# Patient Record
Sex: FEMALE | Race: WHITE | NOT HISPANIC OR LATINO | Employment: FULL TIME | URBAN - METROPOLITAN AREA
[De-identification: names, ages, dates, MRNs, and addresses within clinical notes are randomized per-mention and may not be internally consistent; named-entity substitution may affect disease eponyms.]

---

## 2022-03-02 ENCOUNTER — OFFICE VISIT (OUTPATIENT)
Dept: OBGYN | Facility: CLINIC | Age: 42
End: 2022-03-02
Payer: COMMERCIAL

## 2022-03-02 VITALS
HEIGHT: 64 IN | SYSTOLIC BLOOD PRESSURE: 111 MMHG | BODY MASS INDEX: 30.8 KG/M2 | WEIGHT: 180.4 LBS | HEART RATE: 60 BPM | OXYGEN SATURATION: 96 % | DIASTOLIC BLOOD PRESSURE: 74 MMHG

## 2022-03-02 DIAGNOSIS — Z13.220 SCREENING CHOLESTEROL LEVEL: ICD-10-CM

## 2022-03-02 DIAGNOSIS — Z12.31 VISIT FOR SCREENING MAMMOGRAM: ICD-10-CM

## 2022-03-02 DIAGNOSIS — Z13.1 SCREENING FOR DIABETES MELLITUS: ICD-10-CM

## 2022-03-02 DIAGNOSIS — Z01.419 ENCOUNTER FOR GYNECOLOGICAL EXAMINATION WITHOUT ABNORMAL FINDING: Primary | ICD-10-CM

## 2022-03-02 DIAGNOSIS — Z12.4 PAP SMEAR FOR CERVICAL CANCER SCREENING: ICD-10-CM

## 2022-03-02 DIAGNOSIS — A60.00 GENITAL HERPES SIMPLEX, UNSPECIFIED SITE: ICD-10-CM

## 2022-03-02 PROCEDURE — 99386 PREV VISIT NEW AGE 40-64: CPT | Performed by: OBSTETRICS & GYNECOLOGY

## 2022-03-02 PROCEDURE — 87624 HPV HI-RISK TYP POOLED RSLT: CPT | Performed by: OBSTETRICS & GYNECOLOGY

## 2022-03-02 PROCEDURE — G0145 SCR C/V CYTO,THINLAYER,RESCR: HCPCS | Performed by: OBSTETRICS & GYNECOLOGY

## 2022-03-02 RX ORDER — VALACYCLOVIR HYDROCHLORIDE 500 MG/1
500 TABLET, FILM COATED ORAL 2 TIMES DAILY
Qty: 6 TABLET | Refills: 0 | Status: SHIPPED | OUTPATIENT
Start: 2022-03-02 | End: 2022-03-02

## 2022-03-02 RX ORDER — VALACYCLOVIR HYDROCHLORIDE 500 MG/1
500 TABLET, FILM COATED ORAL 2 TIMES DAILY
Qty: 6 TABLET | Refills: 4 | Status: SHIPPED | OUTPATIENT
Start: 2022-03-02 | End: 2022-04-06

## 2022-03-02 NOTE — PROGRESS NOTES
Elda Prasad is a 41 year old female , who presents for annual exam.   No unusual bleeding, no incontinence, or unusual discharge.       Past Medical History:   Diagnosis Date     Herpes simplex infection of genitourinary system        Past Surgical History:   Procedure Laterality Date     ESSURE TUBAL LIGATION       ZZC BREAST AUGMENTATION         OB History    Para Term  AB Living   4 3 3 0 1 3   SAB IAB Ectopic Multiple Live Births   0 0 0 0 3      # Outcome Date GA Lbr Rainer/2nd Weight Sex Delivery Anes PTL Lv   4 Term  40w0d          3 Term  40w0d          2 Term  40w0d          1 AB                Gyn History:  Gynecological History         Patient's last menstrual period was 2022 (exact date).     STD: Herpes/No PID/No IUD      history of abnormal pap smear:  no  Last pap: No results found for: PAP        Current Outpatient Medications   Medication Sig Dispense Refill     valACYclovir (VALTREX) 500 MG tablet Take 1 tablet (500 mg) by mouth 2 times daily for 3 days 6 tablet 0       Allergies   Allergen Reactions     Codeine Unknown       Social History     Socioeconomic History     Marital status: Single     Spouse name: Not on file     Number of children: Not on file     Years of education: Not on file     Highest education level: Not on file   Occupational History     Not on file   Tobacco Use     Smoking status: Never Smoker     Smokeless tobacco: Never Used   Substance and Sexual Activity     Alcohol use: Not Currently     Drug use: Not on file     Comment: occ. marijuana     Sexual activity: Yes     Partners: Male     Birth control/protection: Implant     Comment: essure   Other Topics Concern     Not on file   Social History Narrative     Not on file     Social Determinants of Health     Financial Resource Strain: Not on file   Food Insecurity: Not on file   Transportation Needs: Not on file   Physical Activity: Not on file   Stress: Not on file   Social  "Connections: Not on file   Intimate Partner Violence: Not on file   Housing Stability: Not on file       Family History   Problem Relation Age of Onset     Lung Cancer Father      Diabetes Maternal Grandmother      Breast Cancer Maternal Grandmother      Heart Failure Maternal Grandfather      Emphysema Paternal Grandmother      Cirrhosis Paternal Grandfather          ROS:  All negative except as above.      EXAM:  /74 (BP Location: Right arm, Cuff Size: Adult Regular)   Pulse 60   Ht 1.632 m (5' 4.25\")   Wt 81.8 kg (180 lb 6.4 oz)   LMP 02/22/2022 (Exact Date)   SpO2 96%   Breastfeeding No   BMI 30.73 kg/m    General:  WNWD female, NAD  Alert  Oriented x 3  Gait:  Normal  Skin:  Normal skin turgor  Neurologic:  CN grossly intact, good sensation.    HEENT:  NC/AT, EOMI  Neck:  No masses palpated, symmetrical, carotids +2/4, no bruits heard  Heart:  RRR  Lungs:  Clear   Breasts:  Symmetrical, no dimpling noted, no masses palpated, no discharge expressed, implants noted.  Incisions inferior to breast noted.   Abdomen:  Non-tender, non-distended.  Vulva: No external lesions, normal hair distribution, no adenopathy  BUS:  Normal, no masses noted  Urethra:  No hypermobility noted  Urethral meatus:  No masses noted  Vagina: Moist, pink, no abnormal discharge, well rugated, no lesions  Cervix: Smooth, pink, no visible lesions  Uterus: Normal size, anteverted, non-tender, mobile  Ovaries: No mass, non-tender, mobile  Perianal:  No masses noted.    Extremities:  No clubbing, cyanosis, or edema      ASSESSMENT/PLAN   Annual examination with pap smear  History of genital herpes.  She has not needed medications for the past 6 months, but she needs a refill.   Prescription written.   Low fat diet, weight bearing exercises and self breast exams on a monthly basis have been recommended.  I have discussed with patient the risks, benefits, medications, treatment options and modalities.   I have instructed the patient to " call or schedule a follow-up appointment if any problems.

## 2022-03-04 LAB
BKR LAB AP GYN ADEQUACY: NORMAL
BKR LAB AP GYN INTERPRETATION: NORMAL
BKR LAB AP HPV REFLEX: NORMAL
BKR LAB AP LMP: NORMAL
BKR LAB AP PREVIOUS ABNORMAL: NORMAL
PATH REPORT.COMMENTS IMP SPEC: NORMAL
PATH REPORT.COMMENTS IMP SPEC: NORMAL
PATH REPORT.RELEVANT HX SPEC: NORMAL

## 2022-03-07 LAB
HUMAN PAPILLOMA VIRUS 16 DNA: NEGATIVE
HUMAN PAPILLOMA VIRUS 18 DNA: NEGATIVE
HUMAN PAPILLOMA VIRUS FINAL DIAGNOSIS: NORMAL
HUMAN PAPILLOMA VIRUS OTHER HR: NEGATIVE

## 2022-03-28 ENCOUNTER — ANCILLARY PROCEDURE (OUTPATIENT)
Dept: MAMMOGRAPHY | Facility: CLINIC | Age: 42
End: 2022-03-28
Attending: OBSTETRICS & GYNECOLOGY
Payer: COMMERCIAL

## 2022-03-28 DIAGNOSIS — Z12.31 VISIT FOR SCREENING MAMMOGRAM: ICD-10-CM

## 2022-03-28 PROCEDURE — 77067 SCR MAMMO BI INCL CAD: CPT | Mod: TC | Performed by: RADIOLOGY

## 2022-04-06 DIAGNOSIS — A60.00 GENITAL HERPES SIMPLEX, UNSPECIFIED SITE: ICD-10-CM

## 2022-04-06 RX ORDER — VALACYCLOVIR HYDROCHLORIDE 500 MG/1
500 TABLET, FILM COATED ORAL DAILY
Qty: 90 TABLET | Refills: 3 | Status: SHIPPED | OUTPATIENT
Start: 2022-04-06 | End: 2024-07-10

## 2022-04-06 NOTE — TELEPHONE ENCOUNTER
Reason for Call:  Medication or medication refill:    Do you use a [facility]?  Name of the pharmacy and phone number for the current request:  [facility] in [place]    Name of the medication requested: valACYclovir (VALTREX) 500 MG tablet    Can we leave a detailed message on this number? YES    Phone number patient can be reached at: Cell number on file:    Telephone Information:   Mobile [phone]     Best Time: anytime    Call taken on 4/6/2022 at 12:48 PM by Ginny Higuera

## 2022-04-06 NOTE — TELEPHONE ENCOUNTER
Pt returned phone call and pt has one refill left.  Pt states has have been having outbreaks due to stress.    Routing to provider to review RX refill request. See previous note on protocol failure due to no creatine level on file in past 12 mos.    Prema Askew, LEEROYN RN

## 2022-04-06 NOTE — TELEPHONE ENCOUNTER
"Requested Prescriptions   Pending Prescriptions Disp Refills     valACYclovir (VALTREX) 500 MG tablet 6 tablet 4     Sig: Take 1 tablet (500 mg) by mouth 2 times daily       Antivirals for Herpes Protocol Failed - 4/6/2022  3:18 PM        Failed - Normal serum creatinine on file in past 12 months     No lab results found.    Ok to refill medication if creatinine is low          Passed - Patient is age 12 or older        Passed - Recent (12 mo) or future (30 days) visit within the authorizing provider's specialty     Patient has had an office visit with the authorizing provider or a provider within the authorizing providers department within the previous 12 mos or has a future within next 30 days. See \"Patient Info\" tab in inbasket, or \"Choose Columns\" in Meds & Orders section of the refill encounter.              Passed - Medication is active on med list           Last seen on 3/2/22 for gynecological exam.    Last prescribed on 3/2/22 6 tablets and 4 refills.      Protocol failed as No creatinine on file.    Unable to reach patient via phone.  Left message to call clinic back at [phone]. Would like to ask pt if she has gone through all 4 refills already, before routing to provider.    Prema Askew, LEEROYN RN    "

## 2022-04-07 NOTE — TELEPHONE ENCOUNTER
Unable to reach patient via phone. RN left a message and instructed patient to call the clinic at [phone].    Lexus Hoffman RN on 4/7/2022 at 10:23 AM

## 2022-10-03 ENCOUNTER — HEALTH MAINTENANCE LETTER (OUTPATIENT)
Age: 42
End: 2022-10-03

## 2023-01-25 NOTE — PROGRESS NOTES
Answers for HPI/ROS submitted by the patient on 1/26/2023  If you checked off any problems, how difficult have these problems made it for you to do your work, take care of things at home, or get along with other people?: Not difficult at all  PHQ9 TOTAL SCORE: 19      Assessment & Plan     Vaginal odor  Await remaining result and treat if indicated  - Wet preparation  - Chlamydia trachomatis PCR  - Neisseria gonorrhoeae PCR    Breakthrough bleeding  Reviewed possible etiologies of the bleeding mid cycle. Check ultrasound and labs. Patient prefers to avoid hormonal medications due to previous experiences with them.   - US Pelvic Transabdominal and Transvaginal; Future  - TSH with free T4 reflex; Future  - TSH with free T4 reflex    Weight gain  Check labs, we did discuss her questions on weight loss medication and she may consider follow up with comprehensive weight management center.  - TSH with free T4 reflex; Future  - TSH with free T4 reflex    Moderate episode of recurrent major depressive disorder (H)  Reviewed her PHQ-9 and discussed her previous history and treatment. At this time, she is declining intervention. We discussed availability of [facility]. To ER immediately with thoughts of harm to self or other and she verbalizes understanding. At this time, declines plans to move forward with self harm.    Vaginitis and vulvovaginitis  Advised this may be causing her spotting. Will treat per below.  - fluconazole (DIFLUCAN) 150 MG tablet; Take 1 tablet (150 mg) by mouth once for 1 dose Repeat in 1 week  - metroNIDAZOLE (METROGEL) 0.75 % vaginal gel; Place 1 applicator (5 g) vaginally At Bedtime for 5 days    ALEE Fernández CNP [facility]   Elda is a 42 year old, presenting for the following health issues:  Abnormal Uterine Bleeding      HPI     Spotting/ cramping    Presents with 4 month history of cramping/spotting at ovulation. Cycles very  "regular, last 5 days. Uses a menstrual cup. Usually with ovulation, may have less than a day of mild cramping and this resolves. Now has been having light spotting for 2-3 days and the cramping is noticeable during that time as well. Then has her normal 5 day cycle when expected. Most recent cycle was abnormally light for her. Essure for contraception.  Has noticed an intermittent vaginal odor for the last year. No other associated vaginal symptoms, pelvic pain, urinary symptoms. Last sexually active April 2022. Amenable to STI screening.  Also concerned of 50 lb weight gain since April 2022. Very active at work. Big increase in stress in the last year. Family history of hypothyroidism.    Review of Systems   Constitutional, HEENT, cardiovascular, pulmonary, gi and gu systems are negative, except as otherwise noted.      Objective    /80 (BP Location: Right arm, Patient Position: Sitting, Cuff Size: Adult Large)   Pulse 63   Ht 1.632 m (5' 4.25\")   Wt 99 kg (218 lb 3.2 oz)   LMP 01/20/2023 (Exact Date)   SpO2 97%   BMI 37.16 kg/m    Body mass index is 37.16 kg/m .  Physical Exam   GENERAL: healthy, alert and no distress  ABDOMEN: soft, nontender, no hepatosplenomegaly, no masses and bowel sounds normal   (female): normal female external genitalia, normal urethral meatus, vaginal mucosa, normal cervix/adnexa/uterus without masses or discharge  MS: no gross musculoskeletal defects noted, no edema  SKIN: no suspicious lesions or rashes  PSYCH: mentation appears normal, affect normal/bright    Results for orders placed or performed in visit on 01/26/23 (from the past 24 hour(s))   Wet preparation    Specimen: Vagina; Swab   Result Value Ref Range    Trichomonas Absent Absent    Yeast Present (A) Absent    Clue Cells Present (A) Absent    WBCs/high power field 3+ (A) None       Depression Screening Follow-up    PHQ 1/26/2023   PHQ-9 Total Score 19   Q9: Thoughts of better off dead/self-harm past 2 weeks " Several days   F/U: Thoughts of suicide or self-harm Yes   F/U: Self harm-plan Yes   F/U: Self-harm action No   F/U: Safety concerns No     [id number]}  Patient was diagnosed with depression as a teenager. Last bad spell of symptoms about 4 years ago. Tried Wellbutrin first, without much improvement. Changed to Lexapro, caused worsening symptoms, suicidal ideation. Patient practices self care habits to help manage her moods. She does not desire a referral to [facility] or mental health provider at this time. Declines use of medication. She contracts for safety. Admits to thoughts of self harm, but no plan to move forward.    Follow Up     Follow Up Actions Taken  Patient declined referral or crisis resource information. We did discuss acute options of [place] crisis line, ED at [facility].     ALEE Fernández CNP

## 2023-01-26 ENCOUNTER — OFFICE VISIT (OUTPATIENT)
Dept: OBGYN | Facility: CLINIC | Age: 43
End: 2023-01-26
Payer: COMMERCIAL

## 2023-01-26 VITALS
WEIGHT: 218.2 LBS | BODY MASS INDEX: 37.25 KG/M2 | HEART RATE: 63 BPM | SYSTOLIC BLOOD PRESSURE: 119 MMHG | DIASTOLIC BLOOD PRESSURE: 80 MMHG | HEIGHT: 64 IN | OXYGEN SATURATION: 97 %

## 2023-01-26 DIAGNOSIS — Z13.1 SCREENING FOR DIABETES MELLITUS: ICD-10-CM

## 2023-01-26 DIAGNOSIS — F33.1 MODERATE EPISODE OF RECURRENT MAJOR DEPRESSIVE DISORDER (H): ICD-10-CM

## 2023-01-26 DIAGNOSIS — R63.5 WEIGHT GAIN: ICD-10-CM

## 2023-01-26 DIAGNOSIS — Z13.220 SCREENING CHOLESTEROL LEVEL: ICD-10-CM

## 2023-01-26 DIAGNOSIS — N89.8 VAGINAL ODOR: ICD-10-CM

## 2023-01-26 DIAGNOSIS — N92.1 BREAKTHROUGH BLEEDING: Primary | ICD-10-CM

## 2023-01-26 DIAGNOSIS — N76.0 VAGINITIS AND VULVOVAGINITIS: ICD-10-CM

## 2023-01-26 LAB
CHOLEST SERPL-MCNC: 199 MG/DL
CLUE CELLS: PRESENT
FASTING STATUS PATIENT QL REPORTED: YES
FASTING STATUS PATIENT QL REPORTED: YES
GLUCOSE BLD-MCNC: 109 MG/DL (ref 70–99)
HDLC SERPL-MCNC: 83 MG/DL
LDLC SERPL CALC-MCNC: 103 MG/DL
NONHDLC SERPL-MCNC: 116 MG/DL
TRICHOMONAS, WET PREP: ABNORMAL
TRIGL SERPL-MCNC: 67 MG/DL
TSH SERPL DL<=0.005 MIU/L-ACNC: 0.61 MU/L (ref 0.4–4)
WBC'S/HIGH POWER FIELD, WET PREP: ABNORMAL
YEAST, WET PREP: PRESENT

## 2023-01-26 PROCEDURE — 87491 CHLMYD TRACH DNA AMP PROBE: CPT | Performed by: NURSE PRACTITIONER

## 2023-01-26 PROCEDURE — 84443 ASSAY THYROID STIM HORMONE: CPT | Performed by: NURSE PRACTITIONER

## 2023-01-26 PROCEDURE — 87210 SMEAR WET MOUNT SALINE/INK: CPT | Performed by: NURSE PRACTITIONER

## 2023-01-26 PROCEDURE — 99213 OFFICE O/P EST LOW 20 MIN: CPT | Performed by: NURSE PRACTITIONER

## 2023-01-26 PROCEDURE — 82947 ASSAY GLUCOSE BLOOD QUANT: CPT | Performed by: NURSE PRACTITIONER

## 2023-01-26 PROCEDURE — 80061 LIPID PANEL: CPT | Performed by: NURSE PRACTITIONER

## 2023-01-26 PROCEDURE — 36415 COLL VENOUS BLD VENIPUNCTURE: CPT | Performed by: NURSE PRACTITIONER

## 2023-01-26 PROCEDURE — 87591 N.GONORRHOEAE DNA AMP PROB: CPT | Performed by: NURSE PRACTITIONER

## 2023-01-26 RX ORDER — METRONIDAZOLE 7.5 MG/G
1 GEL VAGINAL AT BEDTIME
Qty: 70 G | Refills: 0 | Status: SHIPPED | OUTPATIENT
Start: 2023-01-26 | End: 2023-01-30

## 2023-01-26 RX ORDER — FLUCONAZOLE 150 MG/1
150 TABLET ORAL ONCE
Qty: 2 TABLET | Refills: 0 | Status: SHIPPED | OUTPATIENT
Start: 2023-01-26 | End: 2023-01-26

## 2023-01-26 ASSESSMENT — PATIENT HEALTH QUESTIONNAIRE - PHQ9
SUM OF ALL RESPONSES TO PHQ QUESTIONS 1-9: 19
SUM OF ALL RESPONSES TO PHQ QUESTIONS 1-9: 19
10. IF YOU CHECKED OFF ANY PROBLEMS, HOW DIFFICULT HAVE THESE PROBLEMS MADE IT FOR YOU TO DO YOUR WORK, TAKE CARE OF THINGS AT HOME, OR GET ALONG WITH OTHER PEOPLE: NOT DIFFICULT AT ALL

## 2023-01-26 NOTE — PATIENT INSTRUCTIONS
If you have any questions regarding your visit, Please contact your care team.     [email or web address] Services: [phone]  To Schedule an Appointment 24/7  Call: [phone] HOURS TELEPHONE NUMBER     Eusebia Garces- APRN CNP      Sharita Edwards-Surgery Scheduler  Stephanie-Surgery Scheduler         Monday 7:30 am-5:00 pm    Tuesday 8:00 am-4:00 pm    Wednesday 7:30 am-4:00 pm  [place]    Thursday 8:00 am-11:00 am    Friday 7:30 am-4:00 pm [address] [place] [address] [phone] ask for [facility]  [phone] Fax    Imaging Scheduling all locations  [phone]    [facility]  [address] [place] [address]   [place], [place] [id number] [phone]         Urgent Care locations:  [facility]   Monday-Friday  10 am - 8 pm  Saturday and Sunday   9 am - 5 pm     [phone]   If you need a medication refill, please contact your pharmacy. Please allow 3 business days for your refill to be completed.  As always, Thank you for trusting us with your healthcare needs!      see additional instructions from your care team below

## 2023-01-26 NOTE — PROGRESS NOTES
Depression Response    Patient completed the PHQ-9 assessment for depression and scored >9? Yes  Question 9 on the PHQ-9 was positive for suicidality? Yes  Does patient have current mental health provider? No    Is this a virtual visit? No    I personally notified the following: visit provider             Answers for HPI/ROS submitted by the patient on 1/26/2023  If you checked off any problems, how difficult have these problems made it for you to do your work, take care of things at home, or get along with other people?: Not difficult at all  PHQ9 TOTAL SCORE: 19

## 2023-01-27 ENCOUNTER — TELEPHONE (OUTPATIENT)
Dept: OBGYN | Facility: CLINIC | Age: 43
End: 2023-01-27
Payer: COMMERCIAL

## 2023-01-27 DIAGNOSIS — N76.0 VAGINITIS AND VULVOVAGINITIS: Primary | ICD-10-CM

## 2023-01-27 LAB
C TRACH DNA SPEC QL NAA+PROBE: NEGATIVE
N GONORRHOEA DNA SPEC QL NAA+PROBE: NEGATIVE

## 2023-01-27 NOTE — TELEPHONE ENCOUNTER
Last seen by Eusebia 1/26, prescribed Metrogel for Vaginitis and vulvovaginitis.    Pt went to  the medication however it was very expensive. She is wanting to know if there is an alternative medication that can be prescribed for her.    Advised she may not hear back until Monday on a response being end of day. Pt said she is ok to wait until then, symptoms not bothering her.    Anabela Hogue RN on 1/27/2023 at 3:53 PM

## 2023-01-30 RX ORDER — METRONIDAZOLE 500 MG/1
500 TABLET ORAL 2 TIMES DAILY
Qty: 14 TABLET | Refills: 0 | Status: SHIPPED | OUTPATIENT
Start: 2023-01-30 | End: 2023-02-06

## 2023-01-30 NOTE — TELEPHONE ENCOUNTER
Unable to reach patient via phone. Left message to call back at [phone].    Sent pt a [employer] message as well.    Viktoriya [place] RN

## 2023-01-30 NOTE — TELEPHONE ENCOUNTER
Prescription sent for PO Flagyl. Patient to take 1 tablet by mouth twice daily with food. To avoid alcohol intake while on medication. Can cause mild GI upset. Eusebia VICKERS CNP

## 2023-02-02 ENCOUNTER — ANCILLARY PROCEDURE (OUTPATIENT)
Dept: ULTRASOUND IMAGING | Facility: CLINIC | Age: 43
End: 2023-02-02
Attending: NURSE PRACTITIONER
Payer: COMMERCIAL

## 2023-02-02 ENCOUNTER — TELEPHONE (OUTPATIENT)
Dept: OBGYN | Facility: CLINIC | Age: 43
End: 2023-02-02

## 2023-02-02 DIAGNOSIS — N92.1 BREAKTHROUGH BLEEDING: ICD-10-CM

## 2023-02-02 PROCEDURE — 76830 TRANSVAGINAL US NON-OB: CPT | Mod: TC | Performed by: RADIOLOGY

## 2023-02-02 PROCEDURE — 76856 US EXAM PELVIC COMPLETE: CPT | Mod: TC | Performed by: RADIOLOGY

## 2023-02-07 NOTE — TELEPHONE ENCOUNTER
Telephone call to patient and discussed ultrasound result. At this time, she desires to monitor for now and prefers not to do any additional evaluation/intervention. Is working on weight loss as well to see if this helps. Will follow up if she changes her mind and would like additional evaluation and possible treatment. Eusebia VICKERS CNP

## 2023-05-20 ENCOUNTER — HEALTH MAINTENANCE LETTER (OUTPATIENT)
Age: 43
End: 2023-05-20

## 2024-07-10 ENCOUNTER — OFFICE VISIT (OUTPATIENT)
Dept: INTERNAL MEDICINE | Facility: CLINIC | Age: 44
End: 2024-07-10
Payer: COMMERCIAL

## 2024-07-10 VITALS
HEIGHT: 65 IN | DIASTOLIC BLOOD PRESSURE: 75 MMHG | TEMPERATURE: 98.1 F | WEIGHT: 226 LBS | OXYGEN SATURATION: 97 % | SYSTOLIC BLOOD PRESSURE: 110 MMHG | BODY MASS INDEX: 37.65 KG/M2 | HEART RATE: 61 BPM | RESPIRATION RATE: 18 BRPM

## 2024-07-10 DIAGNOSIS — A60.00 GENITAL HERPES SIMPLEX, UNSPECIFIED SITE: ICD-10-CM

## 2024-07-10 DIAGNOSIS — Z23 NEED FOR TDAP VACCINATION: ICD-10-CM

## 2024-07-10 DIAGNOSIS — Z12.31 VISIT FOR SCREENING MAMMOGRAM: ICD-10-CM

## 2024-07-10 DIAGNOSIS — M79.672 BILATERAL FOOT PAIN: ICD-10-CM

## 2024-07-10 DIAGNOSIS — Z11.4 SCREENING FOR HIV (HUMAN IMMUNODEFICIENCY VIRUS): ICD-10-CM

## 2024-07-10 DIAGNOSIS — E66.812 CLASS 2 OBESITY DUE TO EXCESS CALORIES WITHOUT SERIOUS COMORBIDITY WITH BODY MASS INDEX (BMI) OF 37.0 TO 37.9 IN ADULT: Primary | ICD-10-CM

## 2024-07-10 DIAGNOSIS — E66.09 CLASS 2 OBESITY DUE TO EXCESS CALORIES WITHOUT SERIOUS COMORBIDITY WITH BODY MASS INDEX (BMI) OF 37.0 TO 37.9 IN ADULT: Primary | ICD-10-CM

## 2024-07-10 DIAGNOSIS — Z11.59 NEED FOR HEPATITIS C SCREENING TEST: ICD-10-CM

## 2024-07-10 DIAGNOSIS — Z12.11 SCREEN FOR COLON CANCER: ICD-10-CM

## 2024-07-10 DIAGNOSIS — M79.671 BILATERAL FOOT PAIN: ICD-10-CM

## 2024-07-10 LAB
HCV AB SERPL QL IA: NONREACTIVE
HIV 1+2 AB+HIV1 P24 AG SERPL QL IA: NONREACTIVE

## 2024-07-10 PROCEDURE — 87389 HIV-1 AG W/HIV-1&-2 AB AG IA: CPT

## 2024-07-10 PROCEDURE — 86803 HEPATITIS C AB TEST: CPT

## 2024-07-10 PROCEDURE — 99204 OFFICE O/P NEW MOD 45 MIN: CPT | Mod: 25

## 2024-07-10 PROCEDURE — 90715 TDAP VACCINE 7 YRS/> IM: CPT

## 2024-07-10 PROCEDURE — 36415 COLL VENOUS BLD VENIPUNCTURE: CPT

## 2024-07-10 PROCEDURE — 90471 IMMUNIZATION ADMIN: CPT

## 2024-07-10 RX ORDER — PHENTERMINE HYDROCHLORIDE 15 MG/1
15 CAPSULE ORAL EVERY MORNING
Qty: 30 CAPSULE | Refills: 0 | Status: SHIPPED | OUTPATIENT
Start: 2024-07-10 | End: 2024-09-09

## 2024-07-10 RX ORDER — VALACYCLOVIR HYDROCHLORIDE 500 MG/1
500 TABLET, FILM COATED ORAL DAILY
Qty: 90 TABLET | Refills: 3 | Status: SHIPPED | OUTPATIENT
Start: 2024-07-10

## 2024-07-10 ASSESSMENT — PATIENT HEALTH QUESTIONNAIRE - PHQ9
10. IF YOU CHECKED OFF ANY PROBLEMS, HOW DIFFICULT HAVE THESE PROBLEMS MADE IT FOR YOU TO DO YOUR WORK, TAKE CARE OF THINGS AT HOME, OR GET ALONG WITH OTHER PEOPLE: NOT DIFFICULT AT ALL
SUM OF ALL RESPONSES TO PHQ QUESTIONS 1-9: 3
SUM OF ALL RESPONSES TO PHQ QUESTIONS 1-9: 3

## 2024-07-10 NOTE — PROGRESS NOTES
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 Patient has taken phentermine in the past with good results.   Plan: phentermine 15 MG capsule        Prescription sent to pharmacy    (Z12.11) Screen for colon cancer  Comment: Discussed recommendation to screen.   Plan: Colonoscopy Screening  Referral        Future     (Z11.4) Screening for HIV (human immunodeficiency virus)  Comment: Discussed recommendation to screen.   Plan: HIV Antigen Antibody Combo        Pending     (Z11.59) Need for hepatitis C screening test  Comment: Discussed recommendation to screen.   Plan: Hepatitis C Screen Reflex to HCV RNA Quant and         Genotype        Pending     (Z12.31) Visit for screening mammogram  Comment: Discussed recommendation to screen.   Plan: MA Screening Bilateral w/ Akira        Future     (M79.671,  M79.672) Bilateral foot pain  Comment: Patient is having issues with chronic bilateral foot pain and requested a referral to podiatry for further assessment and treatment options.   Plan: REVIEW OF HEALTH MAINTENANCE PROTOCOL ORDERS,         Orthopedic  Referral        Future     (Z23) Need for Tdap vaccination  Comment: Discussed recommendation for vaccine  Plan: TDAP 10-64Y (ADACEL,BOOSTRIX)        Given    (A60.00) Genital herpes simplex, unspecified site  Comment: Chronic, stable. Discussed medication refills.  Plan: valACYclovir (VALTREX) 500 MG tablet        Prescription sent to pharmacy           BMI  Estimated body mass index is 37.61 kg/m  as calculated from the following:    Height as of this encounter: 1.651 m (5' 5\").    Weight as of this encounter: 102.5 kg (226 lb).         CONSULTATION/REFERRAL to " "Podiatry     Subjective   Elda is a 44 year old, presenting for the following health issues:  Foot Pain (Bilateral foot pain/) and Forms (Form to start a  )        7/10/2024     6:48 AM   Additional Questions   Roomed by Taylor REYES     History of Present Illness       Reason for visit:  Foot pain and physicians report needed  Symptom onset:  More than a month    She eats 2-3 servings of fruits and vegetables daily.She consumes 5 sweetened beverage(s) daily.She exercises with enough effort to increase her heart rate 30 to 60 minutes per day.  She exercises with enough effort to increase her heart rate 5 days per week.   She is taking medications regularly.         Review of Systems  Constitutional, HEENT, cardiovascular, pulmonary, gi and gu systems are negative, except as otherwise noted.      Objective    /75 (BP Location: Left arm, Patient Position: Sitting, Cuff Size: Adult Large)   Pulse 61   Temp 98.1  F (36.7  C) (Temporal)   Resp 18   Ht 1.651 m (5' 5\")   Wt 102.5 kg (226 lb)   LMP 07/09/2024   SpO2 97%   BMI 37.61 kg/m    Body mass index is 37.61 kg/m .  Physical Exam  Constitutional:       Appearance: Normal appearance.   HENT:      Head: Normocephalic.      Nose: Nose normal. No congestion or rhinorrhea.   Eyes:      General:         Right eye: No discharge.         Left eye: No discharge.      Conjunctiva/sclera: Conjunctivae normal.      Pupils: Pupils are equal, round, and reactive to light.   Pulmonary:      Effort: Pulmonary effort is normal. No respiratory distress.      Breath sounds: Normal breath sounds. No stridor. No wheezing or rhonchi.   Musculoskeletal:         General: Tenderness present. No swelling or deformity. Normal range of motion.      Comments: Chronic pain and tenderness to both feet.   Skin:     General: Skin is warm.      Coloration: Skin is not jaundiced or pale.      Findings: No bruising or erythema.   Neurological:      General: No focal deficit present.    "   Mental Status: She is alert and oriented to person, place, and time.   Psychiatric:         Mood and Affect: Mood normal.         Behavior: Behavior normal.         Thought Content: Thought content normal.         Judgment: Judgment normal.            No results found for any visits on 07/10/24.        Signed Electronically by: ALEE Cazares CNP

## 2024-07-10 NOTE — NURSING NOTE
Prior to immunization administration, verified patients identity using patient s name and date of birth. Please see Immunization Activity for additional information.     Screening Questionnaire for Adult Immunization    Are you sick today?   No   Do you have allergies to medications, food, a vaccine component or latex?   No   Have you ever had a serious reaction after receiving a vaccination?   No   Do you have a long-term health problem with heart, lung, kidney, or metabolic disease (e.g., diabetes), asthma, a blood disorder, no spleen, complement component deficiency, a cochlear implant, or a spinal fluid leak?  Are you on long-term aspirin therapy?   No   Do you have cancer, leukemia, HIV/AIDS, or any other immune system problem?   No   Do you have a parent, brother, or sister with an immune system problem?   No   In the past 3 months, have you taken medications that affect  your immune system, such as prednisone, other steroids, or anticancer drugs; drugs for the treatment of rheumatoid arthritis, Crohn s disease, or psoriasis; or have you had radiation treatments?   No   Have you had a seizure, or a brain or other nervous system problem?   No   During the past year, have you received a transfusion of blood or blood    products, or been given immune (gamma) globulin or antiviral drug?   No   For women: Are you pregnant or is there a chance you could become       pregnant during the next month?   No   Have you received any vaccinations in the past 4 weeks?   No     Immunization questionnaire answers were all negative.      Patient instructed to remain in clinic for 15 minutes afterwards, and to report any adverse reactions.     Screening performed by Taylor Hernandez CMA on 7/10/2024 at 7:31 AM.

## 2024-07-15 NOTE — PROGRESS NOTES
SUBJECTIVE:                                                   Elda Prasad is a 44 year old female who presents to clinic today for the following health issue(s):  Patient presents with:  Vaginal Problem        HPI:  New patient to me here today with recurring bacterial vaginosis symptoms.  She suspects that she has had symptoms for the last couple of months.  She has not been sexually active in about 2 years.  She uses a menstrual cup.  She does use a scented body wash but does not use Epsom salt or bubble bath.    Patient's last menstrual period was 2024..     Patient is not sexually active, .  Using essure for contraception.    reports that she has never smoked. She has never used smokeless tobacco.    STD testing offered?  Declined    Health maintenance updated: Yes    Today's PHQ-2 Score:       2023     8:25 AM   PHQ-2 (  [employer])   Q1: Little interest or pleasure in doing things 3   Q2: Feeling down, depressed or hopeless 3   PHQ-2 Score 6   Q1: Little interest or pleasure in doing things Nearly every day   Q2: Feeling down, depressed or hopeless Nearly every day   PHQ-2 Score 6     Today's PHQ-9 Score:       2024     7:48 AM   PHQ-9 SCORE   PHQ-9 Total Score MyChart 0   PHQ-9 Total Score 0     Today's NIDA-7 Score:       2024     7:49 AM   NIDA-7 SCORE   Total Score 6 (mild anxiety)   Total Score 6       Problem list and histories reviewed & adjusted, as indicated.  Additional history: as documented.    There is no problem list on file for this patient.    Past Surgical History:   Procedure Laterality Date    ESSURE TUBAL LIGATION      ZZC BREAST AUGMENTATION        Social History     Tobacco Use    Smoking status: Never    Smokeless tobacco: Never   Substance Use Topics    Alcohol use: Not Currently      Problem (# of Occurrences) Relation (Name,Age of Onset)    Heart Failure (1) Maternal Grandfather    Diabetes (1) Maternal Grandmother    Breast Cancer (1) Maternal  "Grandmother    Cirrhosis (1) Paternal Grandfather    Emphysema (1) Paternal Grandmother    Lung Cancer (1) Father              Current Outpatient Medications   Medication Sig Dispense Refill    phentermine 15 MG capsule Take 1 capsule (15 mg) by mouth every morning 30 capsule 0    valACYclovir (VALTREX) 500 MG tablet Take 1 tablet (500 mg) by mouth daily 90 tablet 3     No current facility-administered medications for this visit.     Allergies   Allergen Reactions    Codeine Unknown       ROS:  12 point review of systems negative other than symptoms noted below or in the HPI.  No urinary frequency or dysuria, bladder or kidney problems, Normal menstrual cycles, POSITIVE for:, vaginal discharge      OBJECTIVE:     BP 90/62   Ht 1.632 m (5' 4.25\")   Wt 101.6 kg (224 lb)   LMP 07/09/2024   Breastfeeding No   BMI 38.15 kg/m    Body mass index is 38.15 kg/m .    Exam:  Constitutional:  Appearance: Well nourished, well developed alert, in no acute distress  Neurologic:  Mental Status:  Oriented X3.  Normal strength and tone, sensory exam grossly normal, mentation intact and speech normal.    Psychiatric:  Mentation appears normal and affect normal/bright.  Pelvic Exam:  External Genitalia:     Normal appearance for age, no discharge present, no tenderness present, no inflammatory lesions present, color normal  Vagina:     Normal vaginal vault without central or paravaginal defects, scant amount of thin white discharge present, no inflammatory lesions present, no masses present  Urethra:   Urethral Meatus:  No erythema or lesions present  Cervix:     Appearance healthy, no lesions present, nontender to palpation, no bleeding present  Perineum:     Perineum within normal limits, no evidence of trauma, no rashes or skin lesions present  Anus:     Anus within normal limits, no hemorrhoids present  Inguinal Lymph Nodes:     No lymphadenopathy present  Pubic Hair:     Normal pubic hair distribution for age  Genitalia and " Groin:     No rashes present, no lesions present, no areas of discoloration, no masses present     In-Clinic Test Results:  No results found for this or any previous visit (from the past 24 hour(s)).    ASSESSMENT/PLAN:                                                        ICD-10-CM    1. Vaginal odor  N89.8 Multiplex Vaginal Panel by PCR     MS PELVIC EXAMINATION          There are no Patient Instructions on file for this visit.    44-year-old female with minimal vaginal discharge on exam.  Vaginal culture will be sent we will treat if needed.  We had a long discussion about avoiding scented personal hygiene products.  We have asked her to install a detachable showerhead.  We discussed boric acid vaginal suppositories.    ALEE Fontaine [facility]    Answers submitted by the patient for this visit:  Patient Health Questionnaire (Submitted on 7/16/2024)  PHQ9 TOTAL SCORE: 0  NIDA-7 (Submitted on 7/16/2024)  NIDA 7 TOTAL SCORE: 6

## 2024-07-16 ENCOUNTER — OFFICE VISIT (OUTPATIENT)
Dept: OBGYN | Facility: CLINIC | Age: 44
End: 2024-07-16
Payer: COMMERCIAL

## 2024-07-16 VITALS
SYSTOLIC BLOOD PRESSURE: 90 MMHG | DIASTOLIC BLOOD PRESSURE: 62 MMHG | HEIGHT: 64 IN | BODY MASS INDEX: 38.24 KG/M2 | WEIGHT: 224 LBS

## 2024-07-16 DIAGNOSIS — N89.8 VAGINAL ODOR: Primary | ICD-10-CM

## 2024-07-16 LAB
BACTERIAL VAGINOSIS VAG-IMP: NEGATIVE
CANDIDA DNA VAG QL NAA+PROBE: NOT DETECTED
CANDIDA GLABRATA / CANDIDA KRUSEI DNA: NOT DETECTED
T VAGINALIS DNA VAG QL NAA+PROBE: NOT DETECTED

## 2024-07-16 PROCEDURE — 0352U MULTIPLEX VAGINAL PANEL BY PCR: CPT | Performed by: NURSE PRACTITIONER

## 2024-07-16 PROCEDURE — 99213 OFFICE O/P EST LOW 20 MIN: CPT | Performed by: NURSE PRACTITIONER

## 2024-07-16 PROCEDURE — 99459 PELVIC EXAMINATION: CPT | Performed by: NURSE PRACTITIONER

## 2024-07-16 ASSESSMENT — ANXIETY QUESTIONNAIRES
2. NOT BEING ABLE TO STOP OR CONTROL WORRYING: NEARLY EVERY DAY
7. FEELING AFRAID AS IF SOMETHING AWFUL MIGHT HAPPEN: NOT AT ALL
3. WORRYING TOO MUCH ABOUT DIFFERENT THINGS: NEARLY EVERY DAY
4. TROUBLE RELAXING: NOT AT ALL
GAD7 TOTAL SCORE: 6
7. FEELING AFRAID AS IF SOMETHING AWFUL MIGHT HAPPEN: NOT AT ALL
6. BECOMING EASILY ANNOYED OR IRRITABLE: NOT AT ALL
GAD7 TOTAL SCORE: 6
8. IF YOU CHECKED OFF ANY PROBLEMS, HOW DIFFICULT HAVE THESE MADE IT FOR YOU TO DO YOUR WORK, TAKE CARE OF THINGS AT HOME, OR GET ALONG WITH OTHER PEOPLE?: NOT DIFFICULT AT ALL
5. BEING SO RESTLESS THAT IT IS HARD TO SIT STILL: NOT AT ALL
GAD7 TOTAL SCORE: 6
IF YOU CHECKED OFF ANY PROBLEMS ON THIS QUESTIONNAIRE, HOW DIFFICULT HAVE THESE PROBLEMS MADE IT FOR YOU TO DO YOUR WORK, TAKE CARE OF THINGS AT HOME, OR GET ALONG WITH OTHER PEOPLE: NOT DIFFICULT AT ALL
1. FEELING NERVOUS, ANXIOUS, OR ON EDGE: NOT AT ALL

## 2024-07-16 ASSESSMENT — PATIENT HEALTH QUESTIONNAIRE - PHQ9
SUM OF ALL RESPONSES TO PHQ QUESTIONS 1-9: 0
SUM OF ALL RESPONSES TO PHQ QUESTIONS 1-9: 0

## 2024-07-23 NOTE — PROGRESS NOTES
ASSESSMENT & PLAN    Elda was seen today for pain and pain.    Diagnoses and all orders for this visit:    Pes planus of both feet  -     Orthotics, Mastectomy and Custom Compression Orders    Bilateral foot pain  -     Orthopedic  Referral  -     XR Foot Bilateral G/E 3 Views; Future  -     Orthotics, Mastectomy and Custom Compression Orders    Hallux valgus, right  -     Orthotics, Mastectomy and Custom Compression Orders      Chronic issues overall, with flat foot.  Can use Voltaren gel.  Discussed custom foot orthotics. Also discussed toe spacer given bunion, also cushioning for the area.  See below.  Questions answered. Discussed signs and symptoms that may indicate more serious issues; the patient was instructed to seek appropriate care if noted. Elda indicates understanding of these issues and agrees with the plan.      See Patient Instructions  Patient Instructions   Left foot:  Reviewed ongoing pain since initial injury.  X-rays today demonstrate pes planus (flatfoot), with what appears to be some mild degenerative change in the midfoot.  No obvious other derangement of the foot, though we discussed with initial injury and midfoot pain potential remote Lisfranc sprain.  Discussed considerations around additional support such as with custom foot orthotics, working on rehab exercises, potential for MRI.  Following discussion, referral placed to orthotics and prosthetics for custom foot orthotics.  You can continue with the exercises you have been doing.  Defer any imaging for now.    Right foot:  Appearance consistent with developing bunion.  Consider use of cushioning to the area, also potential for donut pad.  Okay to use the spacer as well.  We discussed trial of topical Voltaren gel, which is available over-the-counter.    Plan to monitor course with use of custom foot orthotics and potential use of gel for symptoms, and we can leave follow-up open-ended.    If you have any further questions for  "your physician or physician s care team you can contact them thru MyChart or by calling [phone].      Baljeet Loco [facility] [place]    -----  Chief Complaint   Patient presents with    Left Foot - Pain    Right Foot - Pain       SUBJECTIVE  Elda Prasad is a/an 44 year old female who is seen as a self referral for evaluation of bilateral feet.     The patient is seen with their daughter.  Onset: 5 month(s) ago. Reports insidious onset without acute precipitating event.  Location of Pain: right - 1st MTP  Left - bottom of the heel, top of the foot   Worsened by: walking for long periods of time, standing   Treatments tried: supportive shoes   Associated symptoms: pain     Orthopedic/Surgical history: YES - Date: 5 years ago she stepped off a curb wrong, has had pain in the left foot ever since.   Social History/Occupation:     **  Above information per rooming staff.  Additional history:  Left foot:  5-6 years ago, stepped off curb wrong, chronic dorsal foot pain since that time.  Recalls having some initial swelling.  Pain has been dorsal foot, midfoot, and along 2nd MT. Now more diffuse and into plantar foot.  No clear joint noise.  Previously some benefit from [employer] shoes.    Right foot:  About 6 months ago pain started. Looks like getting bunion. When that hurt, then noted more diffuse left foot pain.  Perhaps compensating for left foot.      REVIEW OF SYSTEMS:  Review of Systems    OBJECTIVE:  Ht 1.632 m (5' 4.25\")   Wt 101.6 kg (224 lb)   LMP 07/09/2024   BMI 38.15 kg/m           Bilateral Foot exam    ROM: ankles grossly symmetric  some discomfort with midfoot rotation left    Strength: able to resist with PF, DF, inversion, eversion     Tender: right first MTP joint    Inspection: pes planus  Hallux valgus right    Skin:     neg (-) bruising        neg (-) swelling        well perfused       capillary refill brisk         RADIOLOGY:  Final results and " radiologist's interpretation, available in the [facility] health record.  Images were reviewed with the patient in the office today.  My personal interpretation of the performed imaging: no acute bony abnormality noted. Right hallux valgus compared to left. Pes planus.       XR Foot Bilateral G/E 3 Views    Narrative    EXAM: XR FOOT BILATERAL G/E 3 VIEWS  LOCATION: [facility]THOPEDIC [facility]  DATE: 7/24/2024    INDICATION:  Bilateral foot pain, Bilateral foot pain  COMPARISON: None.      Impression    IMPRESSION: Pes planus bilaterally. Mild right hallux valgus. No joint space narrowing. No fracture.

## 2024-07-24 ENCOUNTER — OFFICE VISIT (OUTPATIENT)
Dept: ORTHOPEDICS | Facility: CLINIC | Age: 44
End: 2024-07-24
Payer: COMMERCIAL

## 2024-07-24 ENCOUNTER — ANCILLARY PROCEDURE (OUTPATIENT)
Dept: GENERAL RADIOLOGY | Facility: CLINIC | Age: 44
End: 2024-07-24
Attending: PEDIATRICS
Payer: COMMERCIAL

## 2024-07-24 VITALS — BODY MASS INDEX: 38.24 KG/M2 | HEIGHT: 64 IN | WEIGHT: 224 LBS

## 2024-07-24 DIAGNOSIS — M20.11 HALLUX VALGUS, RIGHT: ICD-10-CM

## 2024-07-24 DIAGNOSIS — M21.42 PES PLANUS OF BOTH FEET: Primary | ICD-10-CM

## 2024-07-24 DIAGNOSIS — M79.672 BILATERAL FOOT PAIN: ICD-10-CM

## 2024-07-24 DIAGNOSIS — M79.671 BILATERAL FOOT PAIN: ICD-10-CM

## 2024-07-24 DIAGNOSIS — M21.41 PES PLANUS OF BOTH FEET: Primary | ICD-10-CM

## 2024-07-24 PROCEDURE — 73630 X-RAY EXAM OF FOOT: CPT | Mod: TC | Performed by: RADIOLOGY

## 2024-07-24 PROCEDURE — 99204 OFFICE O/P NEW MOD 45 MIN: CPT | Performed by: PEDIATRICS

## 2024-07-24 NOTE — Clinical Note
"2024      Elda Prasad  [address] [address]      Dear Colleague,    Thank you for referring your patient, Elda Prasad, to the [facility]. Please see a copy of my visit note below.    ASSESSMENT & PLAN    There are no diagnoses linked to this encounter.  This issue is {ACUTE/CHRONIC:167010} and {IMPROVING WORSENIN}.      {FOLLOW UP PLANS (Optional):089567}    Baljeet Loco,   [facility] [place]    -----  No chief complaint on file.      SUBJECTIVE  Elda Prasad is a/an 44 year old female who is seen as a self referral for evaluation of bilateral feet.     The patient is seen with their daughter.  Onset: 5 month(s) ago. Reports insidious onset without acute precipitating event.  Location of Pain: right - 1st MTP  Left - bottom of the heel, top of the foot   Worsened by: walking for long periods of time, standing   Treatments tried: supportive shoes   Associated symptoms: pain     Orthopedic/Surgical history: YES - Date: 5 years ago she stepped off a curb wrong, has had pain in the left foot ever since.   Social History/Occupation:     **  Above information per rooming staff.  Additional history:  ***      REVIEW OF SYSTEMS:  Review of Systems    OBJECTIVE:  [facility] 2024    General: healthy, alert and in no distress  Skin: no suspicious lesions or rash.  CV: distal perfusion intact ***  Resp: normal respiratory effort without conversational dyspnea   Psych: normal mood and affect  Gait: {FSOC GAIT:267315::\"NORMAL\"}  Neuro: Normal light sensory exam of *** extremity ***      {RIGHT/LEFT:072609} Foot exam    ROM: {foot rom:916344}    Strength: {foot strength:422429}     Tender:{:726376}    Non-Tender:{foot nontender:133799}    Inspection: {foot inspection:324954}    Skin:{foot skin:025983}     Lymphatic: " {lymphatics:495837}    Weightbearing:{weightbearin}          RADIOLOGY:  Final results and radiologist's interpretation, available in the [facility] health record.  Images were reviewed with the patient in the office today.  My personal interpretation of the performed imaging: no acute bony abnormality noted. Right hallux valgus compared to left. Pes planus.       ***        {[facility]  Documentation (Optional):100523}  { E&M time (Optional):509560}  {Provider  Link to [facility] Help Grid :636802}         Again, thank you for allowing me to participate in the care of your patient.        Sincerely,        Baljeet Loco, DO

## 2024-07-24 NOTE — PATIENT INSTRUCTIONS
Left foot:  Reviewed ongoing pain since initial injury.  X-rays today demonstrate pes planus (flatfoot), with what appears to be some mild degenerative change in the midfoot.  No obvious other derangement of the foot, though we discussed with initial injury and midfoot pain potential remote Lisfranc sprain.  Discussed considerations around additional support such as with custom foot orthotics, working on rehab exercises, potential for MRI.  Following discussion, referral placed to orthotics and prosthetics for custom foot orthotics.  You can continue with the exercises you have been doing.  Defer any imaging for now.    Right foot:  Appearance consistent with developing bunion.  Consider use of cushioning to the area, also potential for donut pad.  Okay to use the spacer as well.  We discussed trial of topical Voltaren gel, which is available over-the-counter.    Plan to monitor course with use of custom foot orthotics and potential use of gel for symptoms, and we can leave follow-up open-ended.    If you have any further questions for your physician or physician s care team you can contact them thru [email or web address] or by calling [phone].

## 2024-07-26 ENCOUNTER — ANCILLARY PROCEDURE (OUTPATIENT)
Dept: MAMMOGRAPHY | Facility: CLINIC | Age: 44
End: 2024-07-26
Payer: COMMERCIAL

## 2024-07-26 DIAGNOSIS — Z12.31 VISIT FOR SCREENING MAMMOGRAM: ICD-10-CM

## 2024-07-26 PROCEDURE — 77063 BREAST TOMOSYNTHESIS BI: CPT | Mod: TC | Performed by: STUDENT IN AN ORGANIZED HEALTH CARE EDUCATION/TRAINING PROGRAM

## 2024-07-26 PROCEDURE — 77067 SCR MAMMO BI INCL CAD: CPT | Mod: TC | Performed by: STUDENT IN AN ORGANIZED HEALTH CARE EDUCATION/TRAINING PROGRAM

## 2024-07-28 ENCOUNTER — HEALTH MAINTENANCE LETTER (OUTPATIENT)
Age: 44
End: 2024-07-28

## 2024-08-09 ENCOUNTER — VIRTUAL VISIT (OUTPATIENT)
Dept: INTERNAL MEDICINE | Facility: CLINIC | Age: 44
End: 2024-08-09
Payer: COMMERCIAL

## 2024-08-09 DIAGNOSIS — E66.09 CLASS 2 OBESITY DUE TO EXCESS CALORIES WITHOUT SERIOUS COMORBIDITY WITH BODY MASS INDEX (BMI) OF 37.0 TO 37.9 IN ADULT: Primary | ICD-10-CM

## 2024-08-09 DIAGNOSIS — E66.812 CLASS 2 OBESITY DUE TO EXCESS CALORIES WITHOUT SERIOUS COMORBIDITY WITH BODY MASS INDEX (BMI) OF 37.0 TO 37.9 IN ADULT: Primary | ICD-10-CM

## 2024-08-09 PROCEDURE — 99213 OFFICE O/P EST LOW 20 MIN: CPT | Mod: 95

## 2024-08-09 RX ORDER — PHENTERMINE HYDROCHLORIDE 15 MG/1
30 CAPSULE ORAL EVERY MORNING
Qty: 60 CAPSULE | Refills: 0 | Status: SHIPPED | OUTPATIENT
Start: 2024-08-09 | End: 2024-09-09

## 2024-08-09 NOTE — PROGRESS NOTES
"Elda is a 44 year old who is being evaluated via a billable video visit.    How would you like to obtain your AVS? MyChart  If the video visit is dropped, the invitation should be resent by: Text to cell phone: [phone]  Will anyone else be joining your video visit? No      Assessment & Plan       (E66.09,  Z68.37) Class 2 obesity due to excess calories without serious comorbidity with body mass index (BMI) of 37.0 to 37.9 in adult  Comment: Patient seen in video visit for follow up for use of phentermine for weight loss. Patient noted recent increase in appetite, discussed need to increase dose. Will increase to 30 mg and follow up in a month to assess dose change.   Plan: phentermine 15 MG capsule        Prescription sent to pharmacy           BMI  Estimated body mass index is 38.15 kg/m  as calculated from the following:    Height as of 7/24/24: 1.632 m (5' 4.25\").    Weight as of 7/24/24: 101.6 kg (224 lb).         Subjective   Elda is a 44 year old, presenting for the following health issues:  Recheck Medication (Refill for phentermine )        8/9/2024     7:18 AM   Additional Questions   Roomed by Taylor REYES     History of Present Illness       Reason for visit:  Weight gain    She eats 4 or more servings of fruits and vegetables daily.She consumes 1 sweetened beverage(s) daily.She exercises with enough effort to increase her heart rate 30 to 60 minutes per day.  She exercises with enough effort to increase her heart rate 5 days per week.   She is taking medications regularly.       Medication Followup of Phentermine  Taking Medication as prescribed: yes  Side Effects:  None  Medication Helping Symptoms:  yes  Wants to increase her dose.      Review of Systems  Constitutional, HEENT, cardiovascular, pulmonary, gi and gu systems are negative, except as otherwise noted.      Objective           Vitals:  No vitals were obtained today due to virtual visit.    Physical Exam   GENERAL: alert and no " distress  EYES: Eyes grossly normal to inspection.  No discharge or erythema, or obvious scleral/conjunctival abnormalities.  RESP: No audible wheeze, cough, or visible cyanosis.    SKIN: Visible skin clear. No significant rash, abnormal pigmentation or lesions.  NEURO: Cranial nerves grossly intact.  Mentation and speech appropriate for age.  PSYCH: Appropriate affect, tone, and pace of words        Video-Visit Details    Type of service:  Video Visit   Originating Location (pt. Location): Home  Distant Location (provider location):  On-site  Platform used for Video Visit: Soraya  Signed Electronically by: ALEE Cazares CNP

## 2024-09-09 ENCOUNTER — VIRTUAL VISIT (OUTPATIENT)
Dept: INTERNAL MEDICINE | Facility: CLINIC | Age: 44
End: 2024-09-09
Payer: COMMERCIAL

## 2024-09-09 DIAGNOSIS — E66.09 CLASS 2 OBESITY DUE TO EXCESS CALORIES WITHOUT SERIOUS COMORBIDITY WITH BODY MASS INDEX (BMI) OF 37.0 TO 37.9 IN ADULT: ICD-10-CM

## 2024-09-09 DIAGNOSIS — R63.4 WEIGHT LOSS: Primary | ICD-10-CM

## 2024-09-09 DIAGNOSIS — Z20.822 EXPOSURE TO 2019 NOVEL CORONAVIRUS: ICD-10-CM

## 2024-09-09 DIAGNOSIS — E66.812 CLASS 2 OBESITY DUE TO EXCESS CALORIES WITHOUT SERIOUS COMORBIDITY WITH BODY MASS INDEX (BMI) OF 37.0 TO 37.9 IN ADULT: ICD-10-CM

## 2024-09-09 PROCEDURE — 99214 OFFICE O/P EST MOD 30 MIN: CPT | Mod: 95

## 2024-09-09 RX ORDER — PHENTERMINE HYDROCHLORIDE 15 MG/1
30 CAPSULE ORAL EVERY MORNING
Qty: 60 CAPSULE | Refills: 0 | Status: SHIPPED | OUTPATIENT
Start: 2024-09-09

## 2024-09-09 NOTE — PROGRESS NOTES
"Elda is a 44 year old who is being evaluated via a billable video visit.    How would you like to obtain your AVS? MyChart  If the video visit is dropped, the invitation should be resent by: Text to cell phone: [phone]  Will anyone else be joining your video visit? No      Assessment & Plan     (R63.4) Weight loss  (primary encounter diagnosis) (E66.09,  Z68.37) Class 2 obesity due to excess calories without serious comorbidity with body mass index (BMI) of 37.0 to 37.9 in adult  Comment: Patient seen in video visit to discuss need for medication refills. Discussed refill and follow up.   Plan: phentermine 15 MG capsule        Prescription sent to pharmacy      (Z71.202) Exposure to 2019 novel coronavirus  Comment: Patient tested positive for COVID a few weeks ago is still having some dizziness and did go to ED all labs were noted to be normal. Patient is doing better at today's visit.   Plan: Will update with any new concerns.           BMI  Estimated body mass index is 38.15 kg/m  as calculated from the following:    Height as of 7/24/24: 1.632 m (5' 4.25\").    Weight as of 7/24/24: 101.6 kg (224 lb).   Weight management plan: Patient is taking phentermine to help with weight loss      Subjective   Elda is a 44 year old, presenting for the following health issues:  No chief complaint on file.        9/9/2024     6:55 AM   Additional Questions   Roomed by Taylor REYES     History of Present Illness       Reason for visit:  Weight    She eats 4 or more servings of fruits and vegetables daily.She consumes 1 sweetened beverage(s) daily.She exercises with enough effort to increase her heart rate 30 to 60 minutes per day.  She exercises with enough effort to increase her heart rate 5 days per week.   She is taking medications regularly.         Refill for phentermine.          Objective           Vitals:  No vitals were obtained today due to virtual visit.    Physical Exam   GENERAL: alert and no distress  EYES: Eyes " grossly normal to inspection.  No discharge or erythema, or obvious scleral/conjunctival abnormalities.  RESP: No audible wheeze, cough, or visible cyanosis.    SKIN: Visible skin clear. No significant rash, abnormal pigmentation or lesions.  NEURO: Cranial nerves grossly intact.  Mentation and speech appropriate for age.  PSYCH: Appropriate affect, tone, and pace of words          Video-Visit Details    Type of service:  Video Visit   Originating Location (pt. Location): Home  Distant Location (provider location):  On-site  Platform used for Video Visit: Soraya  Signed Electronically by: ALEE Cazares CNP

## 2025-08-10 ENCOUNTER — HEALTH MAINTENANCE LETTER (OUTPATIENT)
Age: 45
End: 2025-08-10